# Patient Record
Sex: FEMALE | Race: BLACK OR AFRICAN AMERICAN | Employment: UNEMPLOYED | URBAN - METROPOLITAN AREA
[De-identification: names, ages, dates, MRNs, and addresses within clinical notes are randomized per-mention and may not be internally consistent; named-entity substitution may affect disease eponyms.]

---

## 2022-05-19 ENCOUNTER — OFFICE VISIT (OUTPATIENT)
Dept: ORTHOPEDIC SURGERY | Age: 57
End: 2022-05-19
Payer: COMMERCIAL

## 2022-05-19 VITALS — WEIGHT: 135 LBS | BODY MASS INDEX: 23.05 KG/M2 | HEIGHT: 64 IN

## 2022-05-19 DIAGNOSIS — M25.562 CHRONIC PAIN OF LEFT KNEE: ICD-10-CM

## 2022-05-19 DIAGNOSIS — M23.204 DEGENERATIVE TEAR OF MEDIAL MENISCUS, LEFT: ICD-10-CM

## 2022-05-19 DIAGNOSIS — G89.29 CHRONIC PAIN OF LEFT KNEE: ICD-10-CM

## 2022-05-19 DIAGNOSIS — M17.12 PRIMARY OSTEOARTHRITIS OF LEFT KNEE: Primary | ICD-10-CM

## 2022-05-19 PROCEDURE — 20610 DRAIN/INJ JOINT/BURSA W/O US: CPT | Performed by: ORTHOPAEDIC SURGERY

## 2022-05-19 RX ORDER — NABUMETONE 500 MG/1
TABLET, FILM COATED ORAL
COMMUNITY

## 2022-05-19 RX ORDER — TRIAMCINOLONE ACETONIDE 40 MG/ML
40 INJECTION, SUSPENSION INTRA-ARTICULAR; INTRAMUSCULAR ONCE
Status: COMPLETED | OUTPATIENT
Start: 2022-05-19 | End: 2022-05-19

## 2022-05-19 RX ORDER — LIDOCAINE HYDROCHLORIDE 10 MG/ML
2 INJECTION INFILTRATION; PERINEURAL ONCE
Status: COMPLETED | OUTPATIENT
Start: 2022-05-19 | End: 2022-05-19

## 2022-05-19 RX ORDER — HYDROCHLOROTHIAZIDE 12.5 MG/1
TABLET ORAL
COMMUNITY
Start: 2022-05-03

## 2022-05-19 RX ORDER — DESLORATADINE 5 MG/1
TABLET ORAL
COMMUNITY
Start: 2022-05-11

## 2022-05-19 RX ORDER — LEVOTHYROXINE SODIUM 100 UG/1
TABLET ORAL
COMMUNITY
Start: 2022-04-10

## 2022-05-19 RX ORDER — HYDROXYCHLOROQUINE SULFATE 200 MG/1
TABLET, FILM COATED ORAL
COMMUNITY

## 2022-05-19 RX ORDER — FLUTICASONE FUROATE AND VILANTEROL TRIFENATATE 100; 25 UG/1; UG/1
POWDER RESPIRATORY (INHALATION)
COMMUNITY
Start: 2022-03-02

## 2022-05-19 RX ADMIN — TRIAMCINOLONE ACETONIDE 40 MG: 40 INJECTION, SUSPENSION INTRA-ARTICULAR; INTRAMUSCULAR at 14:39

## 2022-05-19 RX ADMIN — LIDOCAINE HYDROCHLORIDE 2 ML: 10 INJECTION INFILTRATION; PERINEURAL at 14:39

## 2022-05-19 NOTE — PROGRESS NOTES
[facility] (: 1965) is a 49 y.o. female, patient, here for evaluation of the following chief complaint(s):  Knee Pain (left knee pain, over extended two months ago has been hurting since )       HPI:    Plaint is left knee pain. She does have documented history of a prior medial meniscus tear that she treated with activity modification and exercise. She remotely had a shot of cortisone in her left knee. Her knee complaint now does not specifically localize to the medial side of the joint but is more retropatellar. She does continue to complain of some medial joint line tenderness though. Knee is not catching or locking but she is very apprehensive to do anything. She has had to stop doing a lot of her walking and exercising due to the severity of her left knee pain now. No Known Allergies    Current Outpatient Medications   Medication Sig    hydroCHLOROthiazide (HYDRODIURIL) 12.5 mg tablet     Breo Ellipta 100-25 mcg/dose inhaler INHALE 1 PUFF BY MOUTH EVERY 24 HOURS    levothyroxine (SYNTHROID) 100 mcg tablet     desloratadine (CLARINEX) 5 mg tablet     hydrOXYchloroQUINE (PlaqueniL) 200 mg tablet 1 tab    nabumetone (RELAFEN) 500 mg tablet 1 tablet     No current facility-administered medications for this visit.        Past Medical History:   Diagnosis Date    Hypothyroid     Lupus (HCC)     OA (osteoarthritis)     RA (refractory anemia) (HCC)         Past Surgical History:   Procedure Laterality Date    HX COLPOSCOPY      HX REFRACTIVE SURGERY         Family History   Problem Relation Age of Onset    Cancer Mother     Cancer Father         Social History     Socioeconomic History    Marital status:      Spouse name: Not on file    Number of children: Not on file    Years of education: Not on file    Highest education level: Not on file   Occupational History    Not on file   Tobacco Use    Smoking status: Never Smoker    Smokeless tobacco: Never Used   Substance and Sexual Activity    Alcohol use: Yes    Drug use: Not on file    Sexual activity: Not on file   Other Topics Concern    Not on file   Social History Narrative    Not on file     Social Determinants of Health     Financial Resource Strain:     Difficulty of Paying Living Expenses: Not on file   Food Insecurity:     Worried About Running Out of Food in the Last Year: Not on file    Lukasz of Food in the Last Year: Not on file   Transportation Needs:     Lack of Transportation (Medical): Not on file    Lack of Transportation (Non-Medical): Not on file   Physical Activity:     Days of Exercise per Week: Not on file    Minutes of Exercise per Session: Not on file   Stress:     Feeling of Stress : Not on file   Social Connections:     Frequency of Communication with Friends and Family: Not on file    Frequency of Social Gatherings with Friends and Family: Not on file    Attends Hinduism Services: Not on file    Active Member of [address] [place] [address] or Organizations: Not on file    Attends Club or Organization Meetings: Not on file    Marital Status: Not on file   Intimate Partner Violence:     Fear of Current or Ex-Partner: Not on file    Emotionally Abused: Not on file    Physically Abused: Not on file    Sexually Abused: Not on file   Housing Stability:     Unable to Pay for Housing in the Last Year: Not on file    Number of [place] in the Last Year: Not on file    Unstable Housing in the Last Year: Not on file       ROS     Positive for: Musculoskeletal    Last edited by Philippe Mtz on 5/19/2022  1:43 PM. (History)            Vitals:  Ht 5' 4\" (1.626 m)   Wt 135 lb (61.2 kg)   BMI 23.17 kg/m²    Body mass index is 23.17 kg/m². PHYSICAL EXAM:  Left lower extremity exam: Knee exam today shows a painless hip range of motion. No knee effusion today. Knee range of motion 0 to 90 degrees. Significant patellofemoral crepitation. Knee appears ligamentously stable.   Kehinde's testing medially elicits pain. She also has pain along her medial plica. Positive patellofemoral grind test.    Extremity is sensate and perfused. Straight leg raise and femoral nerve stretch test are negative. Motor testing reveals intact quads, ankle plantar flexors, ankle dorsiflexors. IMAGING:  XR Results (most recent):  Results from Appointment encounter on 05/19/22    XR KNEE LT 3 V    Narrative  Bilateral knee x-rays. No stress fractures. No effusions. Well-maintained joint spaces. Patellas tracks centrally. Small left knee lateral patella osteophyte and subchondral cyst.  Knees are relatively normal for age. ASSESSMENT/PLAN:  1. Chronic pain of left knee  -     XR KNEE LT 3 V; Future  -     triamcinolone acetonide (KENALOG-40) 40 mg/mL injection 40 mg; 40 mg, Intra artICUlar, ONCE, 1 dose, On Thu 5/19/22 at 1500  -     lidocaine (XYLOCAINE) 10 mg/mL (1 %) injection 2 mL; 2 mL, Intra artICUlar, ONCE, 1 dose, On Thu 5/19/22 at 1500  2. Degenerative tear of medial meniscus, left  -     triamcinolone acetonide (KENALOG-40) 40 mg/mL injection 40 mg; 40 mg, Intra artICUlar, ONCE, 1 dose, On Thu 5/19/22 at 1500  -     lidocaine (XYLOCAINE) 10 mg/mL (1 %) injection 2 mL; 2 mL, Intra artICUlar, ONCE, 1 dose, On Thu 5/19/22 at 1500  3. Primary osteoarthritis of left knee  -     triamcinolone acetonide (KENALOG-40) 40 mg/mL injection 40 mg; 40 mg, Intra artICUlar, ONCE, 1 dose, On Thu 5/19/22 at 1500  -     lidocaine (XYLOCAINE) 10 mg/mL (1 %) injection 2 mL; 2 mL, Intra artICUlar, ONCE, 1 dose, On Thu 5/19/22 at 1500    Mild conservative treatment for the above diagnoses. Physical therapy was prescribed and a shot of corticosteroid was given. She can use Tylenol and over-the-counter meds as needed. Follow-up 1 month if not improved for consideration of an MRI. Discussed risks/benefits of cortisone injection and patient gave verbal consent.   Under sterile conditions, the left knee was injected with  2cc 1% Lidocaine and 1 cc 40 mg/cc Kenalog intra-articularly, tolerated the procedure well. An electronic signature was used to authenticate this note.   --Giovanni Auguste MD